# Patient Record
Sex: FEMALE | Race: WHITE | NOT HISPANIC OR LATINO | Employment: OTHER | URBAN - METROPOLITAN AREA
[De-identification: names, ages, dates, MRNs, and addresses within clinical notes are randomized per-mention and may not be internally consistent; named-entity substitution may affect disease eponyms.]

---

## 2021-12-23 ENCOUNTER — APPOINTMENT (OUTPATIENT)
Dept: RADIOLOGY | Facility: MEDICAL CENTER | Age: 86
DRG: 065 | End: 2021-12-23
Attending: EMERGENCY MEDICINE
Payer: MEDICARE

## 2021-12-23 ENCOUNTER — APPOINTMENT (OUTPATIENT)
Dept: RADIOLOGY | Facility: MEDICAL CENTER | Age: 86
DRG: 065 | End: 2021-12-23
Attending: STUDENT IN AN ORGANIZED HEALTH CARE EDUCATION/TRAINING PROGRAM
Payer: MEDICARE

## 2021-12-23 ENCOUNTER — HOSPITAL ENCOUNTER (INPATIENT)
Facility: MEDICAL CENTER | Age: 86
LOS: 1 days | DRG: 065 | End: 2021-12-24
Attending: EMERGENCY MEDICINE | Admitting: STUDENT IN AN ORGANIZED HEALTH CARE EDUCATION/TRAINING PROGRAM
Payer: MEDICARE

## 2021-12-23 DIAGNOSIS — I48.91 ATRIAL FIBRILLATION WITH RAPID VENTRICULAR RESPONSE (HCC): ICD-10-CM

## 2021-12-23 DIAGNOSIS — I48.91 ATRIAL FIBRILLATION WITH RVR (HCC): ICD-10-CM

## 2021-12-23 DIAGNOSIS — I65.22 STENOSIS OF LEFT CAROTID ARTERY: ICD-10-CM

## 2021-12-23 DIAGNOSIS — Z79.01 ANTICOAGULATED: ICD-10-CM

## 2021-12-23 DIAGNOSIS — I63.9 ACUTE ISCHEMIC STROKE (HCC): ICD-10-CM

## 2021-12-23 DIAGNOSIS — I65.29 STENOSIS OF CAROTID ARTERY, UNSPECIFIED LATERALITY: ICD-10-CM

## 2021-12-23 LAB
ABO + RH BLD: NORMAL
ABO GROUP BLD: NORMAL
ALBUMIN SERPL BCP-MCNC: 4.5 G/DL (ref 3.2–4.9)
ALBUMIN/GLOB SERPL: 1.6 G/DL
ALP SERPL-CCNC: 82 U/L (ref 30–99)
ALT SERPL-CCNC: 21 U/L (ref 2–50)
ANION GAP SERPL CALC-SCNC: 16 MMOL/L (ref 7–16)
APTT PPP: 30.1 SEC (ref 24.7–36)
AST SERPL-CCNC: 31 U/L (ref 12–45)
BASOPHILS # BLD AUTO: 0.3 % (ref 0–1.8)
BASOPHILS # BLD: 0.03 K/UL (ref 0–0.12)
BILIRUB SERPL-MCNC: 0.6 MG/DL (ref 0.1–1.5)
BLD GP AB SCN SERPL QL: NORMAL
BUN SERPL-MCNC: 17 MG/DL (ref 8–22)
CALCIUM SERPL-MCNC: 9.9 MG/DL (ref 8.5–10.5)
CHLORIDE SERPL-SCNC: 101 MMOL/L (ref 96–112)
CO2 SERPL-SCNC: 18 MMOL/L (ref 20–33)
CREAT SERPL-MCNC: 0.58 MG/DL (ref 0.5–1.4)
EOSINOPHIL # BLD AUTO: 0 K/UL (ref 0–0.51)
EOSINOPHIL NFR BLD: 0 % (ref 0–6.9)
ERYTHROCYTE [DISTWIDTH] IN BLOOD BY AUTOMATED COUNT: 47.8 FL (ref 35.9–50)
EST. AVERAGE GLUCOSE BLD GHB EST-MCNC: 117 MG/DL
ETHANOL BLD-MCNC: <10.1 MG/DL (ref 0–10)
GLOBULIN SER CALC-MCNC: 2.8 G/DL (ref 1.9–3.5)
GLUCOSE SERPL-MCNC: 163 MG/DL (ref 65–99)
HBA1C MFR BLD: 5.7 % (ref 4–5.6)
HCT VFR BLD AUTO: 39.4 % (ref 37–47)
HGB BLD-MCNC: 12.8 G/DL (ref 12–16)
IMM GRANULOCYTES # BLD AUTO: 0.04 K/UL (ref 0–0.11)
IMM GRANULOCYTES NFR BLD AUTO: 0.5 % (ref 0–0.9)
INR PPP: 1.24 (ref 0.87–1.13)
LYMPHOCYTES # BLD AUTO: 0.89 K/UL (ref 1–4.8)
LYMPHOCYTES NFR BLD: 10.3 % (ref 22–41)
MCH RBC QN AUTO: 30.8 PG (ref 27–33)
MCHC RBC AUTO-ENTMCNC: 32.5 G/DL (ref 33.6–35)
MCV RBC AUTO: 94.7 FL (ref 81.4–97.8)
MONOCYTES # BLD AUTO: 0.51 K/UL (ref 0–0.85)
MONOCYTES NFR BLD AUTO: 5.9 % (ref 0–13.4)
NEUTROPHILS # BLD AUTO: 7.17 K/UL (ref 2–7.15)
NEUTROPHILS NFR BLD: 83 % (ref 44–72)
NRBC # BLD AUTO: 0 K/UL
NRBC BLD-RTO: 0 /100 WBC
PLATELET # BLD AUTO: 193 K/UL (ref 164–446)
PMV BLD AUTO: 11.2 FL (ref 9–12.9)
POTASSIUM SERPL-SCNC: 4.1 MMOL/L (ref 3.6–5.5)
PROT SERPL-MCNC: 7.3 G/DL (ref 6–8.2)
PROTHROMBIN TIME: 15.3 SEC (ref 12–14.6)
RBC # BLD AUTO: 4.16 M/UL (ref 4.2–5.4)
RH BLD: NORMAL
SODIUM SERPL-SCNC: 135 MMOL/L (ref 135–145)
TROPONIN T SERPL-MCNC: 11 NG/L (ref 6–19)
WBC # BLD AUTO: 8.6 K/UL (ref 4.8–10.8)

## 2021-12-23 PROCEDURE — 94760 N-INVAS EAR/PLS OXIMETRY 1: CPT

## 2021-12-23 PROCEDURE — 82077 ASSAY SPEC XCP UR&BREATH IA: CPT

## 2021-12-23 PROCEDURE — 80053 COMPREHEN METABOLIC PANEL: CPT

## 2021-12-23 PROCEDURE — 86900 BLOOD TYPING SEROLOGIC ABO: CPT

## 2021-12-23 PROCEDURE — 73630 X-RAY EXAM OF FOOT: CPT | Mod: RT

## 2021-12-23 PROCEDURE — 700102 HCHG RX REV CODE 250 W/ 637 OVERRIDE(OP): Performed by: STUDENT IN AN ORGANIZED HEALTH CARE EDUCATION/TRAINING PROGRAM

## 2021-12-23 PROCEDURE — 83036 HEMOGLOBIN GLYCOSYLATED A1C: CPT

## 2021-12-23 PROCEDURE — 93005 ELECTROCARDIOGRAM TRACING: CPT | Performed by: EMERGENCY MEDICINE

## 2021-12-23 PROCEDURE — 700101 HCHG RX REV CODE 250: Performed by: EMERGENCY MEDICINE

## 2021-12-23 PROCEDURE — 770020 HCHG ROOM/CARE - TELE (206)

## 2021-12-23 PROCEDURE — 70498 CT ANGIOGRAPHY NECK: CPT

## 2021-12-23 PROCEDURE — 0042T CT-CEREBRAL PERFUSION ANALYSIS: CPT

## 2021-12-23 PROCEDURE — 99221 1ST HOSP IP/OBS SF/LOW 40: CPT | Performed by: PSYCHIATRY & NEUROLOGY

## 2021-12-23 PROCEDURE — 99285 EMERGENCY DEPT VISIT HI MDM: CPT

## 2021-12-23 PROCEDURE — 85025 COMPLETE CBC W/AUTO DIFF WBC: CPT

## 2021-12-23 PROCEDURE — 73080 X-RAY EXAM OF ELBOW: CPT | Mod: LT

## 2021-12-23 PROCEDURE — 85610 PROTHROMBIN TIME: CPT

## 2021-12-23 PROCEDURE — 85730 THROMBOPLASTIN TIME PARTIAL: CPT

## 2021-12-23 PROCEDURE — 71045 X-RAY EXAM CHEST 1 VIEW: CPT

## 2021-12-23 PROCEDURE — 70551 MRI BRAIN STEM W/O DYE: CPT | Mod: ME

## 2021-12-23 PROCEDURE — 97162 PT EVAL MOD COMPLEX 30 MIN: CPT

## 2021-12-23 PROCEDURE — A9270 NON-COVERED ITEM OR SERVICE: HCPCS | Performed by: STUDENT IN AN ORGANIZED HEALTH CARE EDUCATION/TRAINING PROGRAM

## 2021-12-23 PROCEDURE — 700102 HCHG RX REV CODE 250 W/ 637 OVERRIDE(OP): Performed by: EMERGENCY MEDICINE

## 2021-12-23 PROCEDURE — 84484 ASSAY OF TROPONIN QUANT: CPT

## 2021-12-23 PROCEDURE — 86901 BLOOD TYPING SEROLOGIC RH(D): CPT

## 2021-12-23 PROCEDURE — 97535 SELF CARE MNGMENT TRAINING: CPT

## 2021-12-23 PROCEDURE — 70496 CT ANGIOGRAPHY HEAD: CPT | Mod: MH

## 2021-12-23 PROCEDURE — 70450 CT HEAD/BRAIN W/O DYE: CPT

## 2021-12-23 PROCEDURE — 86850 RBC ANTIBODY SCREEN: CPT

## 2021-12-23 PROCEDURE — A9270 NON-COVERED ITEM OR SERVICE: HCPCS | Performed by: EMERGENCY MEDICINE

## 2021-12-23 PROCEDURE — 700117 HCHG RX CONTRAST REV CODE 255: Performed by: EMERGENCY MEDICINE

## 2021-12-23 PROCEDURE — 96374 THER/PROPH/DIAG INJ IV PUSH: CPT | Mod: XU

## 2021-12-23 PROCEDURE — 700105 HCHG RX REV CODE 258: Performed by: INTERNAL MEDICINE

## 2021-12-23 PROCEDURE — 92610 EVALUATE SWALLOWING FUNCTION: CPT

## 2021-12-23 PROCEDURE — 99223 1ST HOSP IP/OBS HIGH 75: CPT | Mod: AI | Performed by: STUDENT IN AN ORGANIZED HEALTH CARE EDUCATION/TRAINING PROGRAM

## 2021-12-23 RX ORDER — ROSUVASTATIN CALCIUM 20 MG/1
40 TABLET, COATED ORAL ONCE
Status: COMPLETED | OUTPATIENT
Start: 2021-12-23 | End: 2021-12-23

## 2021-12-23 RX ORDER — ROSUVASTATIN CALCIUM 20 MG/1
40 TABLET, COATED ORAL EVERY EVENING
Status: DISCONTINUED | OUTPATIENT
Start: 2021-12-23 | End: 2021-12-24 | Stop reason: HOSPADM

## 2021-12-23 RX ORDER — ROSUVASTATIN CALCIUM 10 MG/1
10 TABLET, COATED ORAL EVERY EVENING
COMMUNITY

## 2021-12-23 RX ORDER — METOPROLOL TARTRATE 1 MG/ML
5 INJECTION, SOLUTION INTRAVENOUS ONCE
Status: COMPLETED | OUTPATIENT
Start: 2021-12-23 | End: 2021-12-23

## 2021-12-23 RX ORDER — METOPROLOL TARTRATE 50 MG/1
50 TABLET, FILM COATED ORAL TWICE DAILY
Status: DISCONTINUED | OUTPATIENT
Start: 2021-12-23 | End: 2021-12-23

## 2021-12-23 RX ORDER — METOPROLOL TARTRATE 50 MG/1
50 TABLET, FILM COATED ORAL 3 TIMES DAILY
COMMUNITY

## 2021-12-23 RX ORDER — LATANOPROST 50 UG/ML
1 SOLUTION/ DROPS OPHTHALMIC EVERY EVENING
Status: DISCONTINUED | OUTPATIENT
Start: 2021-12-23 | End: 2021-12-24 | Stop reason: HOSPADM

## 2021-12-23 RX ORDER — FLUTICASONE PROPIONATE 50 MCG
2 SPRAY, SUSPENSION (ML) NASAL
COMMUNITY

## 2021-12-23 RX ORDER — ACETAMINOPHEN 325 MG/1
325 TABLET ORAL EVERY 4 HOURS PRN
COMMUNITY

## 2021-12-23 RX ORDER — DORZOLAMIDE HYDROCHLORIDE AND TIMOLOL MALEATE 20; 5 MG/ML; MG/ML
1 SOLUTION/ DROPS OPHTHALMIC 2 TIMES DAILY
COMMUNITY

## 2021-12-23 RX ORDER — ASPIRIN 81 MG/1
81 TABLET, CHEWABLE ORAL DAILY
Status: DISCONTINUED | OUTPATIENT
Start: 2021-12-23 | End: 2021-12-23

## 2021-12-23 RX ORDER — ATORVASTATIN CALCIUM 20 MG/1
80 TABLET, FILM COATED ORAL EVERY EVENING
Status: DISCONTINUED | OUTPATIENT
Start: 2021-12-23 | End: 2021-12-23

## 2021-12-23 RX ORDER — SODIUM CHLORIDE, SODIUM LACTATE, POTASSIUM CHLORIDE, CALCIUM CHLORIDE 600; 310; 30; 20 MG/100ML; MG/100ML; MG/100ML; MG/100ML
INJECTION, SOLUTION INTRAVENOUS CONTINUOUS
Status: ACTIVE | OUTPATIENT
Start: 2021-12-23 | End: 2021-12-24

## 2021-12-23 RX ORDER — DORZOLAMIDE HYDROCHLORIDE AND TIMOLOL MALEATE 20; 5 MG/ML; MG/ML
1 SOLUTION/ DROPS OPHTHALMIC 2 TIMES DAILY
Status: DISCONTINUED | OUTPATIENT
Start: 2021-12-23 | End: 2021-12-24 | Stop reason: HOSPADM

## 2021-12-23 RX ORDER — METOPROLOL TARTRATE 50 MG/1
50 TABLET, FILM COATED ORAL EVERY 8 HOURS
Status: DISCONTINUED | OUTPATIENT
Start: 2021-12-23 | End: 2021-12-23

## 2021-12-23 RX ORDER — BIMATOPROST 0.3 MG/ML
1 SOLUTION/ DROPS OPHTHALMIC EVERY EVENING
COMMUNITY

## 2021-12-23 RX ORDER — METOPROLOL SUCCINATE 25 MG/1
12.5 TABLET, EXTENDED RELEASE ORAL DAILY
COMMUNITY

## 2021-12-23 RX ADMIN — DORZOLAMIDE HYDROCHLORIDE AND TIMOLOL MALEATE 1 DROP: 20; 5 SOLUTION/ DROPS OPHTHALMIC at 17:45

## 2021-12-23 RX ADMIN — LATANOPROST 1 DROP: 50 SOLUTION OPHTHALMIC at 17:45

## 2021-12-23 RX ADMIN — APIXABAN 2.5 MG: 5 TABLET, FILM COATED ORAL at 06:22

## 2021-12-23 RX ADMIN — ROSUVASTATIN CALCIUM 40 MG: 20 TABLET, FILM COATED ORAL at 06:44

## 2021-12-23 RX ADMIN — METOPROLOL TARTRATE 50 MG: 50 TABLET, FILM COATED ORAL at 04:31

## 2021-12-23 RX ADMIN — APIXABAN 2.5 MG: 5 TABLET, FILM COATED ORAL at 17:44

## 2021-12-23 RX ADMIN — SODIUM CHLORIDE, POTASSIUM CHLORIDE, SODIUM LACTATE AND CALCIUM CHLORIDE: 600; 310; 30; 20 INJECTION, SOLUTION INTRAVENOUS at 10:20

## 2021-12-23 RX ADMIN — METOPROLOL TARTRATE 5 MG: 1 INJECTION, SOLUTION INTRAVENOUS at 03:55

## 2021-12-23 RX ADMIN — IOHEXOL 40 ML: 350 INJECTION, SOLUTION INTRAVENOUS at 03:23

## 2021-12-23 RX ADMIN — IOHEXOL 80 ML: 350 INJECTION, SOLUTION INTRAVENOUS at 03:26

## 2021-12-23 RX ADMIN — ROSUVASTATIN CALCIUM 40 MG: 20 TABLET, FILM COATED ORAL at 17:45

## 2021-12-23 ASSESSMENT — ENCOUNTER SYMPTOMS
CHILLS: 0
PSYCHIATRIC NEGATIVE: 1
GASTROINTESTINAL NEGATIVE: 1
FOCAL WEAKNESS: 1
VOMITING: 0
MYALGIAS: 0
CONSTITUTIONAL NEGATIVE: 1
ABDOMINAL PAIN: 0
SHORTNESS OF BREATH: 0
DEPRESSION: 0
CARDIOVASCULAR NEGATIVE: 1
HEADACHES: 0
DIZZINESS: 0
RESPIRATORY NEGATIVE: 1
EYES NEGATIVE: 1
WEAKNESS: 1
NAUSEA: 0
MUSCULOSKELETAL NEGATIVE: 1
FEVER: 0

## 2021-12-23 ASSESSMENT — CHA2DS2 SCORE
AGE 65 TO 74: NO
HYPERTENSION: NO
DIABETES: NO
SEX: FEMALE
CHF OR LEFT VENTRICULAR DYSFUNCTION: NO
AGE 75 OR GREATER: YES
VASCULAR DISEASE: NO
CHA2DS2 VASC SCORE: 5
PRIOR STROKE OR TIA OR THROMBOEMBOLISM: YES

## 2021-12-23 ASSESSMENT — COGNITIVE AND FUNCTIONAL STATUS - GENERAL
MOBILITY SCORE: 20
WALKING IN HOSPITAL ROOM: A LITTLE
CLIMB 3 TO 5 STEPS WITH RAILING: A LITTLE
STANDING UP FROM CHAIR USING ARMS: A LITTLE
MOVING FROM LYING ON BACK TO SITTING ON SIDE OF FLAT BED: A LITTLE
SUGGESTED CMS G CODE MODIFIER MOBILITY: CJ

## 2021-12-23 ASSESSMENT — GAIT ASSESSMENTS
ASSISTIVE DEVICE: HAND HELD ASSIST;FRONT WHEEL WALKER
GAIT LEVEL OF ASSIST: MINIMAL ASSIST
DEVIATION: STEP TO;OTHER (COMMENT)
DISTANCE (FEET): 100

## 2021-12-23 ASSESSMENT — PAIN DESCRIPTION - PAIN TYPE: TYPE: ACUTE PAIN

## 2021-12-23 NOTE — ED NOTES
Rounded on Pt. Updated Pt on plan of care. Pt verbalized understanding.  No acute distress at this time.  Will continue to monitor.

## 2021-12-23 NOTE — CARE PLAN
The patient is Stable - Low risk of patient condition declining or worsening    Shift Goals  Clinical Goals: free from falls  Patient Goals: comfort  Family Goals: doyle    Progress made toward(s) clinical / shift goals:  fall precautions in place, paged PT to evaluate pt's gait, coordination    Patient is not progressing towards the following goals:

## 2021-12-23 NOTE — ASSESSMENT & PLAN NOTE
?TIA, no CVA seen on MRI   Admit patient to neurology floor  Neurology consulted  MRI brain without contrast  Neuro check every 4 hours  Continue crestor

## 2021-12-23 NOTE — ED NOTES
Pt had MRI approximately 2 months ago at [facility]; Pt has not had implants or surgeries since.

## 2021-12-23 NOTE — ED TRIAGE NOTES
"Chief Complaint   Patient presents with   • Weakness     [facility]KW [facility] L [facility]ided weakness, slurred speech. pt left, symptoms worsened 4 hrs ago. pt A&Ox1-2, alert to self        BIB [place] [facility] to [facility], pt on monitor and in gown, labs drawn and sent. Pt consists of: above complaint, pt had fall PTA d/t pt s/s worsening - increasing weakness. pt dx w/ dysarthria in previous hospital. Pt baseline is A&Ox4, pt from [facility], hx of afib and stent placement, pt on eliquis. Daughter at bedside. Pt called code stroke IR upon arrival, ERP, ct, and lab at bedside immediately. Taken to ct immediately.        NIHSS score 4     Medications given en route:n/a     BP (!) 207/110   Pulse (!) 136   Resp 18   Ht 1.753 m (5' 9\")   Wt 81.6 kg (180 lb)   SpO2 99%   BMI 26.58 kg/m²   "

## 2021-12-23 NOTE — CONSULTS
Neurology Consult  Neurohospitalist Service, [facility]    Referring Physician: Margaret Nelson D.O.    Reason for Referral: Slurred speech and left sided weakness    HPI: Rosibel Corbett is a 86 y.o. left-handed female with history of HTN, HLD, CAD with stent, A Fib on Eliquis who presented to [facility] via EMS this morning s/p being found on the floor by family s/p presumed fall. BP on arrival 207/110. FSBS 170. Prior to her fall, the patient was seen at [facility] on 12/22/21. Her daughter, Marisela, reports that the patient awoke at 0500 on the morning of 12/22/21 and complained of not feeling well. She then went back to bed. She awoke around noon, and her daughter made her some lunch, but the patient was unable to feed herself, which is a change from baseline. Her daughter states she had slurred speech and was confused and asking repetitive questions. The patient's family brought her to [facility] in [place] for evaluation. The patient had a negative head CT, and CTA at OSH revealed severe stenosis of the left ICA at 80% and moderate stenossi of the R ICA at 50%. Per the daughter, an MRI was recommended to rule out stroke, but the patient declined, wishing to go home. She returned to her family's cabin in [place]. Her daughter reports that she continued to be confused, struggled to find the bathroom, was confused about the location of the room in which she was staying within the cabin. Of note, this is not the patient's home environment. She resides in an independent living senior center in [place], and is staying with her family in [place] for the holidays. She eventually went to bed, but her family found her on the floor during the night which prompted them to call 911 and led to her arrival at our facility. Neuro imaging was repeated at this facility on arrival. CT head again without acute abnomality, CTA head and neck redemonstarted aforementioned  bilateral ICA stenosis. The patient reports stenosis of b/l ICA is known and followed in her home town of [place]. She has a follow up appointment with vascular surgeon, Dr. Daniel Callejas, on Jan 4th. At the time of my exam the patient is without focal neurologic deficit. She reports chronic weakness of her LLE for which she receives PT. She has fresh abrasions to her RLE and left great toe , presumably from a fall overnight which led to her being found on the floor. The patient has no recollection of this event.     Review of systems: In addition to what is detailed in the HPI above, all other systems reviewed and are negative.    Past Medical History:    has a past medical history of Hypertension.    FHx:  family history is not on file.    SHx:   reports that she has never smoked. She has never used smokeless tobacco. She reports current alcohol use. She reports that she does not use drugs.    Allergies:  No Known Allergies    Medications:    Current Facility-Administered Medications:   •  Allow for permissive hypertension: SBP up to 220 mmHg/DBP to 120 mmHg; If SBP greater than 220 mmHg or DBP greater than 120 mmHg administer PRN antihypertensive medications., , Other, PHARMACY TO DOSE, Gucci Nava M.D.  •  rosuvastatin (CRESTOR) tablet 40 mg, 40 mg, Oral, Q EVENING, Gucci Nava M.D.  •  apixaban (ELIQUIS) tablet 2.5 mg, 2.5 mg, Oral, BID, Gucci Nava M.D., 2.5 mg at 12/23/21 0622  •  dorzolamide-timolol (COSOPT) 22.3-6.8 MG/ML ophthalmic drops 1 Drop, 1 Drop, Both Eyes, BID, Gucci Nava M.D.  •  latanoprost (XALATAN) 0.005 % ophthalmic solution 1 Drop, 1 Drop, Both Eyes, Q EVENING, Gucci Nava M.D.  •  lactated ringers infusion, , Intravenous, Continuous, Margaret Nelson D.O., Last Rate: 75 mL/hr at 12/23/21 1020, New Bag at 12/23/21 1020    Current Outpatient Medications:   •  acetaminophen (TYLENOL) 325 MG Tab, Take 325 mg by mouth every four hours as needed for Mild  Pain., Disp: , Rfl:   •  bimatoprost (LUMIGAN) 0.03 % Solution, Administer 1 Drop into both eyes every evening., Disp: , Rfl:   •  dorzolamide-timolol (COSOPT) 22.3-6.8 MG/ML Solution, Administer 1 Drop into both eyes 2 times a day., Disp: , Rfl:   •  apixaban (ELIQUIS) 2.5mg Tab, Take 2.5 mg by mouth 2 times a day., Disp: , Rfl:   •  fluticasone (FLONASE) 50 MCG/ACT nasal spray, Administer 2 Sprays into affected nostril(S) at bedtime., Disp: , Rfl:   •  metoprolol SR (TOPROL XL) 25 MG TABLET SR 24 HR, Take 12.5 mg by mouth every day., Disp: , Rfl:   •  metoprolol tartrate (LOPRESSOR) 50 MG Tab, Take 50 mg by mouth in the morning, at noon, and at bedtime., Disp: , Rfl:   •  rosuvastatin (CRESTOR) 10 MG Tab, Take 10 mg by mouth every evening., Disp: , Rfl:     Physical Examination:    Vitals:    12/23/21 1000 12/23/21 1015 12/23/21 1020 12/23/21 1100   BP: (!) 164/97   145/71   Pulse: 93 91 98 81   Resp: (!) 22 (!) 22 18 18   Temp:       SpO2: 98% 98% 96% 98%   Weight:       Height:           General: Patient is awake and in no acute distress  Eyes: examination of optic disks not indicated at this time given acuity of consult  CV: A Fib     NEUROLOGICAL EXAM:     Mental status: Awake, alert and fully oriented, follows commands  Speech and language: speech is not dysarthric. The patient is able to name and repeat.  Cranial nerve exam: Pupils are equal, round and reactive to light bilaterally. Visual fields are full. 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 Extraocular muscles are intact. Sensation in the face is intact to light touch. Face is symmetric. Tongue is midline.  Motor exam: Strength is 3/5 in the left lower extremity. Of note, she reports chronic LLE weakness for which she receives PT. All other extremities 4+/5. Tone is normal. No abnormal movements were seen on exam.  Sensory exam: No sensory deficits identified   Deep tendon reflexes:  deferred  Coordination: no ataxia   Gait: deferred    NIHSS: National Institutes of Health Stroke Scale at 1204     [0] 1a:Level of Consciousness             0-alert 1-drowsy   2-stupor   3-coma  [0] 1b:LOC Questions                          0-both  1-one      2-neither  [0] 1c:LOC Commands                        0-both  1-one      2-neither  [0] 2: Best Gaze                        0-nl    1-partial  2-forced  [0] 3: Visual Fields                               0-nl    1-partial  2-complete 3-bilat  [0] 4: Facial Paresis                  0-nl    1-minor    2-partial  3-full    MOTOR                                               0-nl  [0] 5: Right Arm                         1-drift  [0] 6: Left Arm                                      2-some effort vs gravity  [0] 7: Right Leg                         3-no effort vs gravity  [1] 8: Left Leg                                      4-no movement                                                              x-untestable    [0] 9: Limb Ataxia                      0-abs   1-1_limb   2-2+_limbs   x-untestable  [0] 10:Sensory                          0-nl    1-partial  2-dense  [0] 11:Best Language/Aphasia            0-nl    1-mild/mod 2-severe   3-mute  [0] 12:Dysarthria                       0-nl    1-mild/mod 2-severe   x-untestable  [0] 13:Neglect/Inattention                    0-none  1-partial  2-complete    [1] TOTAL     Modified Yessi Scale (MRS): 3 = Moderate disability; requires some help, but able to walk without assistance    Objective Data:    Labs:  Lab Results   Component Value Date/Time    PROTHROMBTM 15.3 (H) 12/23/2021 03:06 AM    INR 1.24 (H) 12/23/2021 03:06 AM      Lab Results   Component Value Date/Time    WBC 8.6 12/23/2021 03:06 AM    RBC 4.16 (L) 12/23/2021 03:06 AM    HEMOGLOBIN 12.8 12/23/2021 03:06 AM    HEMATOCRIT 39.4 12/23/2021 03:06 AM    MCV 94.7 12/23/2021 03:06 AM    MCH 30.8 12/23/2021 03:06 AM    MCHC 32.5 (L) 12/23/2021 03:06 AM    MPV 11.2  12/23/2021 03:06 AM    NEUTSPOLYS 83.00 (H) 12/23/2021 03:06 AM    LYMPHOCYTES 10.30 (L) 12/23/2021 03:06 AM    MONOCYTES 5.90 12/23/2021 03:06 AM    EOSINOPHILS 0.00 12/23/2021 03:06 AM    BASOPHILS 0.30 12/23/2021 03:06 AM      Lab Results   Component Value Date/Time    SODIUM 135 12/23/2021 03:06 AM    POTASSIUM 4.1 12/23/2021 03:06 AM    CHLORIDE 101 12/23/2021 03:06 AM    CO2 18 (L) 12/23/2021 03:06 AM    GLUCOSE 163 (H) 12/23/2021 03:06 AM    BUN 17 12/23/2021 03:06 AM    CREATININE 0.58 12/23/2021 03:06 AM      No results found for: CHOLSTRLTOT, LDL, HDL, TRIGLYCERIDE    Lab Results   Component Value Date/Time    ALKPHOSPHAT 82 12/23/2021 03:06 AM    ASTSGOT 31 12/23/2021 03:06 AM    ALTSGPT 21 12/23/2021 03:06 AM    TBILIRUBIN 0.6 12/23/2021 03:06 AM        Imaging/Testing:    I interpreted and/or reviewed the patient's neuroimaging    MR-BRAIN-W/O   Final Result      1.  Mild cerebral atrophy. Age-appropriate.   2.  Moderate supratentorial white matter disease most consistent with microvascular ischemic change.   3.  No evidence of acute infarction, acute hemorrhage, or mass lesion.      DX-FOOT-COMPLETE 3+ RIGHT   Final Result      No acute fracture or dislocation is noted.      DX-ELBOW-COMPLETE 3+ LEFT   Final Result      No radiographic evidence of acute traumatic injury.      DX-CHEST-PORTABLE (1 VIEW)   Final Result      1.  No acute cardiac or pulmonary abnormalities are identified.   2.  Cardiomegaly.   3.  Prominent interstitial markings likely reflecting chronic small airways disease.      CT-CTA NECK WITH & W/O-POST PROCESSING   Final Result      1.  No occlusion or dissection of the arteries of the neck.   2.  Severe stenosis of the left internal carotid artery origin. Moderate stenosis of the right internal carotid artery origin.   3.  Gas again seen in the right  space space of uncertain clinical significance. Correlate for trauma or infection.   4.  Gas in the facial vein and right  IJ vein likely related to venous access.      CT-CTA HEAD WITH & W/O-POST PROCESS   Final Result      No large vessel occlusion or aneurysm      CT-CEREBRAL PERFUSION ANALYSIS   Final Result      1.  Cerebral blood flow less than 30% likely representing completed infarct = 0 mL.      2.  T Max more than 6 seconds likely representing combination of completed infarct and ischemia = 7 mL.      3.  Mismatched volume likely representing ischemic brain/penumbra = 7 mL      4.  Please note that the cerebral perfusion was performed on the limited brain tissue around the basal ganglia region. Infarct/ischemia outside the CT perfusion sections can be missed in this study.      CT-HEAD W/O   Final Result      1. Cerebral atrophy.   2. White matter lucencies most consistent with small vessel ischemic change versus demyelination or gliosis.   3. No acute intracranial abnormality.   4. Soft tissue gas of the right retropharyngeal fat and right  space of uncertain clinical significance. Correlate for trauma or infection.             Assessment and Plan:    Rosibel Corbett is a 86 y.o. left-handed female with history of HTN, HLD, CAD with stent, A Fib on Eliis who presented to [facility] via EMS this morning s/p sustaining a fall. BP on arrival 207/110. FSBS 170. Prior to her fall, the patient was seen at [facility] on 12/22/21 for slurred speech and confusion, discharged to home following evaluation and negative head CT. Vessel imaging at OSH revealed severe stenosis of the left ICA at 80% and moderate stenosis of the R ICA at 50%. Neuro imaging repeated at this facility on arrival. CT head again without acute abnomality, CTA head and neck redemonstarted aforementioned bilateral ICA stenosis. The patient reports stenosis of b/l ICA is known and followed in her home town of [place]. She has a follow up appointment with vascular surgeon, Dr. Daniel Callejas, on Jan 4th. At the time of my exam the patient is  without focal neurologic deficit. MRI Brain without is negative for acute infarct. Considered differential diagnosis of left hemispheric TIA due to severe stenosis of the left ICA, however, there is no clear report of right sided weakness or aphasia in the history from patient or daughter or in the documentation from this visit. Reported history is more concerning for acute confusion in setting of a change in the patient's environment. She is already optimized from a stroke prevention perspective. Recommend continuing home medications and follow up with PCP and established vascular surgeon on 1/4/21. No further recommendations or further studies from an acute neurological standpoint at this time. Please re-consult if you have further questions or there is a change in status.    Plan:  - Continue home medications  - Follow up with established vascular surgeon, Dr. Daniel Callejas, for ongoing monitoring and management of known carotid stenosis.    The evaluation of the patient, and recommended management, was discussed with attending neurologist, Dr. Emmett Pollock and hospitalist, Dr. Margaret Nelson.    Corinne Canavero, APRN  Acute Care Neurohospitalist Service

## 2021-12-23 NOTE — ED NOTES
Med rec completed per pt's transfer paperwork from [facility] and interview with pt's daughter.   Allergies reviewed.

## 2021-12-23 NOTE — ED NOTES
Report received, assuming care.  Pt incontinent of urine, pt cleaned, bedding changed.  Pt requesting to be placed on bed pan and voided 500cc's of urine. Pt A&Ox2, disoriented to place and situation.  Denies pain.  No drift noted.  MRI here to take pt to her scan, pt transported via gurney.

## 2021-12-23 NOTE — ED NOTES
Report given to CHAVA Mansfield for transfer to [place] [address], transported with all personal belongings

## 2021-12-23 NOTE — ED NOTES
Introduced self to Pt; informed her that I am part of her care team.  Rounded on Pt. Updated Pt on plan of care. Pt verbalized understanding.  No acute distress at this time.  Will continue to monitor.

## 2021-12-23 NOTE — H&P
Hospital Medicine History & Physical Note    Date of Service  12/23/2021    Primary Care Physician  No primary care provider on file.    Consultants  neurology    Specialist Names: Dr. Pollock    Code Status  Full Code    Chief Complaint  Chief Complaint   Patient presents with   • Weakness     LKW 1000 pt to [facility] for L sided weakness, slurred speech. pt left, symptoms worsened 4 hrs ago. pt A&Ox1-2, alert to self        History of Presenting Illness  Rosibel Corbett is a 86 y.o. female who presented 12/23/2021 with a mechanical fall and altered mental status.  Patient initially was noted to have weakness of the left upper extremity at 1 PM yesterday.  She was then taken to hospital in [facility], CT head there was negative, and she was sent home.  Shortly after she got home, she began to have episodes of confusion where she did not know where she was.  She decided to sleep it off, she woke up in the middle of the night and had a mechanical fall, and was noted to have facial droop and left upper extremity weakness, motivating daughter to call EMS to take patient to the ED for evaluation.    Patient has a history of 2 cardiac stents placed in 2010.  Denies history of stroke in the past.    In the ED, patient was found to be hypertensive and tachycardic.  Remarkable labs include hyperglycemia, INR 1.24.  CT head and neck pertinent for severe stenosis of the left internal carotid artery region and moderate stenosis of the right internal carotid artery region.  EKG shows atrial fibrillation with RVR.  Neurology was consulted, recommend MRI brain without contrast in a.m. and to continue Eliquis. No candidate for intervention as patient out of window.    I discussed the plan of care with patient.    Review of Systems  Review of Systems   Constitutional: Negative.    HENT: Negative.    Eyes: Negative.    Respiratory: Negative.    Cardiovascular: Negative.    Gastrointestinal: Negative.    Genitourinary: Negative.     Musculoskeletal: Negative.    Skin: Negative.    Neurological: Positive for focal weakness.   Endo/Heme/Allergies: Negative.    Psychiatric/Behavioral: Negative.        Past Medical History   has a past medical history of Hypertension.    Surgical History   has a past surgical history that includes stent placement.     Family History   Family history reviewed with patient. There is no family history that is pertinent to the chief complaint.     Social History   reports that she has never smoked. She has never used smokeless tobacco. She reports current alcohol use. She reports that she does not use drugs.    Allergies  No Known Allergies    Medications  Prior to Admission Medications   Prescriptions Last Dose Informant Patient Reported? Taking?   acetaminophen (TYLENOL) 325 MG Tab UNK  Yes Yes   Sig: Take 325 mg by mouth every four hours as needed for Mild Pain.   apixaban (ELIQUIS) 2.5mg Tab unk at unk  Yes Yes   Sig: Take 2.5 mg by mouth 2 times a day.   bimatoprost (LUMIGAN) 0.03 % Solution unk at unk  Yes Yes   Sig: Administer 1 Drop into both eyes every evening.   dorzolamide-timolol (COSOPT) 22.3-6.8 MG/ML Solution unk at unk  Yes Yes   Sig: Administer 1 Drop into both eyes 2 times a day.   fluticasone (FLONASE) 50 MCG/ACT nasal spray unk at unk  Yes Yes   Sig: Administer 2 Sprays into affected nostril(S) at bedtime.   metoprolol SR (TOPROL XL) 25 MG TABLET SR 24 HR unk at unk  Yes Yes   Sig: Take 12.5 mg by mouth every day.   metoprolol tartrate (LOPRESSOR) 50 MG Tab unk at unk  Yes Yes   Sig: Take 50 mg by mouth in the morning, at noon, and at bedtime.      Facility-Administered Medications: None       Physical Exam  Pulse:  [131-143] 136  Resp:  [18-21] 18  BP: (169-207)/(110-138) 207/110  SpO2:  [92 %-99 %] 99 %  Blood Pressure : (!) 207/110       Pulse: (!) 136   Respiration: 18   Pulse Oximetry: 99 %       Physical Exam  Constitutional:       Appearance: Normal appearance. She is normal weight.   HENT:       Head: Normocephalic.      Nose: Nose normal.      Mouth/Throat:      Mouth: Mucous membranes are moist.   Eyes:      Pupils: Pupils are equal, round, and reactive to light.   Cardiovascular:      Rate and Rhythm: Tachycardia present. Rhythm irregular.   Pulmonary:      Effort: Pulmonary effort is normal.      Breath sounds: Normal breath sounds.   Abdominal:      General: Abdomen is flat. Bowel sounds are normal.      Palpations: Abdomen is soft.   Musculoskeletal:         General: Normal range of motion.      Cervical back: Normal range of motion.   Skin:     General: Skin is warm.   Neurological:      Mental Status: She is alert and oriented to person, place, and time. Mental status is at baseline.      Comments: Strength 3 out of 5 left upper extremity, 5 out of 5 in all extremities.  Sensation intact in all extremities  No facial droop no tongue deviation no dysarthria no dysdiadochokinesia   Psychiatric:         Mood and Affect: Mood normal.         Behavior: Behavior normal.         Thought Content: Thought content normal.         Judgment: Judgment normal.         Laboratory:  Recent Labs     12/23/21 0306   WBC 8.6   RBC 4.16*   HEMOGLOBIN 12.8   HEMATOCRIT 39.4   MCV 94.7   MCH 30.8   MCHC 32.5*   RDW 47.8   PLATELETCT 193   MPV 11.2     Recent Labs     12/23/21  0306   SODIUM 135   POTASSIUM 4.1   CHLORIDE 101   CO2 18*   GLUCOSE 163*   BUN 17   CREATININE 0.58   CALCIUM 9.9     Recent Labs     12/23/21  0306   ALTSGPT 21   ASTSGOT 31   ALKPHOSPHAT 82   TBILIRUBIN 0.6   GLUCOSE 163*     Recent Labs     12/23/21  0306   APTT 30.1   INR 1.24*     No results for input(s): NTPROBNP in the last 72 hours.      Recent Labs     12/23/21  0306   TROPONINT 11       Imaging:  DX-FOOT-COMPLETE 3+ RIGHT   Final Result      No acute fracture or dislocation is noted.      DX-ELBOW-COMPLETE 3+ LEFT   Final Result      No radiographic evidence of acute traumatic injury.      DX-CHEST-PORTABLE (1 VIEW)   Final Result       1.  No acute cardiac or pulmonary abnormalities are identified.   2.  Cardiomegaly.   3.  Prominent interstitial markings likely reflecting chronic small airways disease.      CT-CTA NECK WITH & W/O-POST PROCESSING   Final Result      1.  No occlusion or dissection of the arteries of the neck.   2.  Severe stenosis of the left internal carotid artery origin. Moderate stenosis of the right internal carotid artery origin.   3.  Gas again seen in the right  space space of uncertain clinical significance. Correlate for trauma or infection.   4.  Gas in the facial vein and right IJ vein likely related to venous access.      CT-CTA HEAD WITH & W/O-POST PROCESS   Final Result      No large vessel occlusion or aneurysm      CT-CEREBRAL PERFUSION ANALYSIS   Final Result      1.  Cerebral blood flow less than 30% likely representing completed infarct = 0 mL.      2.  T Max more than 6 seconds likely representing combination of completed infarct and ischemia = 7 mL.      3.  Mismatched volume likely representing ischemic brain/penumbra = 7 mL      4.  Please note that the cerebral perfusion was performed on the limited brain tissue around the basal ganglia region. Infarct/ischemia outside the CT perfusion sections can be missed in this study.      CT-HEAD W/O   Final Result      1. Cerebral atrophy.   2. White matter lucencies most consistent with small vessel ischemic change versus demyelination or gliosis.   3. No acute intracranial abnormality.   4. Soft tissue gas of the right retropharyngeal fat and right  space of uncertain clinical significance. Correlate for trauma or infection.         MR-BRAIN-W/O    (Results Pending)       X-Ray:  I have personally reviewed the images and compared with prior images.    Assessment/Plan:  I anticipate this patient will require at least two midnights for appropriate medical management, necessitating inpatient admission.    * Stroke (HCC)  Assessment &  Plan  Admit patient to neurology floor  Neurology consulted, follow official note  Will need neuro  MRI brain without contrast  Neuro check every 4 hours  Permissive hypertension  Start patient on crestor     Carotid artery stenosis  Assessment & Plan  Seen on CT angio  Will eventually need vascular surgery consult throughout admission    Atrial fibrillation with RVR ([facility])  Assessment & Plan  Continue metoprolol, metoprolol IV as needed  Continue eliquis   Telemetry

## 2021-12-23 NOTE — PROGRESS NOTES
[facility] Medicine Daily Progress Note    Date of Service  12/23/2021    Chief Complaint  Rosibel Corbett is a 86 y.o. female admitted 12/23/2021 with left sided weakness and slurreed speech.     Hospital Course   86 y.o. female who presented 12/23/2021 with a mechanical fall and altered mental status.  Patient initially was noted to have weakness of the left upper extremity at 1 PM yesterday.  She was then taken to hospital in [facility], CT head there was negative, and she was sent home.  Shortly after she got home, she began to have episodes of confusion where she did not know where she was.  She decided to sleep it off, she woke up in the middle of the night and had a mechanical fall, and was noted to have facial droop and left upper extremity weakness, motivating daughter to call EMS to take patient to the ED for evaluation. In the ED, patient was found to be hypertensive and tachycardic. CT head and neck pertinent for severe stenosis of the left internal carotid artery region and moderate stenosis of the right internal carotid artery region.  EKG shows atrial fibrillation with RVR.  Neurology was consulted, recommend MRI brain without contrast in a.m. and to continue Eliquis. No candidate for intervention as patient out of window.    Interval Problem Update  Patient tachycardic and with permissive hypertension. MRI brain showed mild cerebral atrophy, moderate supratentorial white matter disease, no evidence of acute infarction. Spoke with patient's daughter at length, updated on plan of care. Patient has known ICA stenosis being seen by vascular surgery in [place] has an appointment 1/4/22. Pending PT/OT.     I have personally seen and examined the patient at bedside. I discussed the plan of care with patient, family, bedside RN and neurology.    Consultants/Specialty  neurology    Code Status  Full Code    Disposition  Patient is not medically cleared for discharge.   Anticipate discharge to TBD.  I have  placed the appropriate orders for post-discharge needs.    Review of Systems  Review of Systems   Constitutional: Negative for chills and fever.   Respiratory: Negative for shortness of breath.    Cardiovascular: Negative for chest pain.   Gastrointestinal: Negative for abdominal pain, nausea and vomiting.   Genitourinary: Negative for dysuria.   Musculoskeletal: Negative for myalgias.   Skin: Negative for rash.   Neurological: Positive for weakness. Negative for dizziness and headaches.   Psychiatric/Behavioral: Negative for depression.   All other systems reviewed and are negative.       Physical Exam  Temp:  [36.7 °C (98.1 °F)] 36.7 °C (98.1 °F)  Pulse:  [] 89  Resp:  [16-66] 26  BP: (136-207)/() 146/66  SpO2:  [92 %-99 %] 96 %    Physical Exam  Vitals and nursing note reviewed.   Constitutional:       General: She is not in acute distress.  HENT:      Head: Normocephalic and atraumatic.   Eyes:      Conjunctiva/sclera: Conjunctivae normal.   Cardiovascular:      Rate and Rhythm: Tachycardia present. Rhythm irregular.      Heart sounds: No murmur heard.      Pulmonary:      Effort: Pulmonary effort is normal. No respiratory distress.      Breath sounds: Normal breath sounds. No wheezing.   Abdominal:      General: Abdomen is flat. There is no distension.      Palpations: Abdomen is soft.      Tenderness: There is no abdominal tenderness.   Musculoskeletal:         General: Normal range of motion.      Cervical back: Normal range of motion and neck supple.      Right lower leg: No edema.      Left lower leg: No edema.   Skin:     General: Skin is warm and dry.   Neurological:      General: No focal deficit present.      Mental Status: She is alert and oriented to person, place, and time.      Cranial Nerves: No cranial nerve deficit.      Sensory: No sensory deficit.      Motor: Weakness (LUE) present.      Comments: No facial droop   Psychiatric:         Mood and Affect: Mood normal.         Behavior:  Behavior normal.         Fluids  No intake or output data in the 24 hours ending 12/23/21 1401    Laboratory  Recent Labs     12/23/21  0306   WBC 8.6   RBC 4.16*   HEMOGLOBIN 12.8   HEMATOCRIT 39.4   MCV 94.7   MCH 30.8   MCHC 32.5*   RDW 47.8   PLATELETCT 193   MPV 11.2     Recent Labs     12/23/21  0306   SODIUM 135   POTASSIUM 4.1   CHLORIDE 101   CO2 18*   GLUCOSE 163*   BUN 17   CREATININE 0.58   CALCIUM 9.9     Recent Labs     12/23/21  0306   APTT 30.1   INR 1.24*               Imaging  MR-BRAIN-W/O   Final Result      1.  Mild cerebral atrophy. Age-appropriate.   2.  Moderate supratentorial white matter disease most consistent with microvascular ischemic change.   3.  No evidence of acute infarction, acute hemorrhage, or mass lesion.      DX-FOOT-COMPLETE 3+ RIGHT   Final Result      No acute fracture or dislocation is noted.      DX-ELBOW-COMPLETE 3+ LEFT   Final Result      No radiographic evidence of acute traumatic injury.      DX-CHEST-PORTABLE (1 VIEW)   Final Result      1.  No acute cardiac or pulmonary abnormalities are identified.   2.  Cardiomegaly.   3.  Prominent interstitial markings likely reflecting chronic small airways disease.      CT-CTA NECK WITH & W/O-POST PROCESSING   Final Result      1.  No occlusion or dissection of the arteries of the neck.   2.  Severe stenosis of the left internal carotid artery origin. Moderate stenosis of the right internal carotid artery origin.   3.  Gas again seen in the right  space space of uncertain clinical significance. Correlate for trauma or infection.   4.  Gas in the facial vein and right IJ vein likely related to venous access.      CT-CTA HEAD WITH & W/O-POST PROCESS   Final Result      No large vessel occlusion or aneurysm      CT-CEREBRAL PERFUSION ANALYSIS   Final Result      1.  Cerebral blood flow less than 30% likely representing completed infarct = 0 mL.      2.  T Max more than 6 seconds likely representing combination of  completed infarct and ischemia = 7 mL.      3.  Mismatched volume likely representing ischemic brain/penumbra = 7 mL      4.  Please note that the cerebral perfusion was performed on the limited brain tissue around the basal ganglia region. Infarct/ischemia outside the CT perfusion sections can be missed in this study.      CT-HEAD W/O   Final Result      1. Cerebral atrophy.   2. White matter lucencies most consistent with small vessel ischemic change versus demyelination or gliosis.   3. No acute intracranial abnormality.   4. Soft tissue gas of the right retropharyngeal fat and right  space of uncertain clinical significance. Correlate for trauma or infection.              Assessment/Plan  * Stroke (HCC)  Assessment & Plan  ?TIA, no CVA seen on MRI   Admit patient to neurology floor  Neurology consulted  MRI brain without contrast  Neuro check every 4 hours  Continue crestor     Carotid artery stenosis  Assessment & Plan  Seen on CT angio  Pending neuro decision on inpt vs outpt vascular consult   -sees Dr Win Callejas, vascular surgery outpt, appointment on 1/4/21    Atrial fibrillation with RVR (HCC)  Assessment & Plan  Continue metoprolol, metoprolol IV as needed  Continue eliquis   Telemetry         VTE prophylaxis: SCDs/TEDs and therapeutic anticoagulation with Eliquis

## 2021-12-23 NOTE — ED NOTES
MRI screening completed.  Rounded on Pt. Updated Pt on plan of care. Pt verbalized understanding.  No acute distress at this time.  Will continue to monitor.

## 2021-12-23 NOTE — ASSESSMENT & PLAN NOTE
Seen on CT angio  Pending neuro decision on inpt vs outpt vascular consult   -sees Dr Win Callejas, vascular surgery outpt, appointment on 1/4/21

## 2021-12-23 NOTE — ED PROVIDER NOTES
ED Provider Note    CHIEF COMPLAINT  Chief Complaint   Patient presents with   • Weakness     [facility], slurred speech. pt left, symptoms worsened 4 hrs ago. pt A&Ox1-2, alert to self        HPI    Primary care provider: Not on file, visiting from [place]  Means of arrival: EMS   History obtained from: Patient, daughter, medics  History limited by: Nothing    Rosibel Corbett is a 86 y.o. female who presents with concerns for strokelike symptoms.  Approximately 20 hours ago the patient developed some dysarthria, she presented to [facility] and apparently had a CT scan that was negative, she was planned to be hospitalized for an MRI to rule out a stroke but her symptoms got better and so she left the hospital on her own accord.  She was feeling better, but then several hours ago she started having recurrent dysarthria as well as some left-sided weakness and becoming more confused than usual.  The patient is a retired [employer]  and is often very high functioning and she has now confused and cannot name the year.  Daughter reports that the patient is on Eliquis for known atrial fibrillation.  No reported alleviating factors noted, family noticed these symptoms tonight because they heard a thud and found the patient on the floor.  No obvious head injury for medics or bruising.  The patient denies any other associated recent medical illness like chest pain or dyspnea or fevers or vomiting.  No double or blurry or loss of vision.  No aggravating factors noted.  Symptoms have been stuttering on and off for the last 20 hours.  Given less than 24 hours of symptoms, patient was activated as a stroke IR per field report, but it was determined given she has had trauma, 20+ hours of stuttering symptoms, and anticoagulated on apixaban she is not a candidate for alteplase/TPA.    REVIEW OF SYSTEMS  Constitutional: Negative for fever or chills.  Positive for  "possibly falling out of bed.  HENT: Negative for rhinorrhea or sore throat.    Eyes: Negative for double or blurry or loss of vision.  Respiratory: Negative for cough or shortness of breath.    Cardiovascular: Negative for chest pain or syncope.   Gastrointestinal: Negative for nausea, vomiting, or abdominal pain.   Genitourinary: Negative for dysuria or flank pain.   Musculoskeletal: Negative for back pain or joint pain.   Skin: Negative for itching or rash.   Neurological: Positive for speech changes and confusion and left arm and face weakness.  See HPI for further details. All other systems are negative.     PAST MEDICAL HISTORY   has a past medical history of Hypertension.    PAST FAMILY HISTORY  History reviewed. No pertinent family history.    SOCIAL HISTORY  Social History     Tobacco Use   • Smoking status: Never Smoker   • Smokeless tobacco: Never Used   Vaping Use   • Vaping Use: Never used   Substance and Sexual Activity   • Alcohol use: Yes     Comment: occ   • Drug use: Never   • Sexual activity: Not on file       SURGICAL HISTORY   has a past surgical history that includes stent placement.    CURRENT MEDICATIONS  Home Medications     Reviewed by Tamela Faria (Pharmacy Tech) on 12/23/21 at 0344  Med List Status: Complete   Medication Last Dose Status   acetaminophen (TYLENOL) 325 MG Tab UNK Active   apixaban (ELIQUIS) 2.5mg Tab unk Active   bimatoprost (LUMIGAN) 0.03 % Solution unk Active   dorzolamide-timolol (COSOPT) 22.3-6.8 MG/ML Solution unk Active   fluticasone (FLONASE) 50 MCG/ACT nasal spray unk Active   metoprolol SR (TOPROL XL) 25 MG TABLET SR 24 HR unk Active   metoprolol tartrate (LOPRESSOR) 50 MG Tab unk Active                ALLERGIES  No Known Allergies    PHYSICAL EXAM  VITAL SIGNS: /87   Pulse 91   Temp 37.2 °C (98.9 °F) (Temporal)   Resp 18   Ht 1.753 m (5' 9\")   Wt 81.6 kg (180 lb)   SpO2 97%   BMI 26.58 kg/m²    Pulse ox interpretation: On room air, I " interpret this pulse ox as normal.  Constitutional: Well-appearing for age but sitting up in moderate distress.  HEENT: Normocephalic, atraumatic. Posterior pharynx clear, mucous membranes dry, subtle left facial droop present.  Eyes:  EOMI. Normal sclerae.  Neck: Supple, nontender.  Chest/Pulmonary: Clear to ausculation bilaterally, no wheezes or rhonchi.  Cardiovascular: Fast, irregular, 2 out of 6 systolic murmur.  Abdomen: Soft, nontender; no rebound, guarding, or masses.  Back: No CVA or midline tenderness.   Musculoskeletal: No deformity or tenderness, but there is trace symmetric lower extremity edema.  Neuro: Subtle left facial droop, 4-5 strength in left hand with mild pronator drift, confused to the year, NIH stroke scale 4 on arrival.  Psych: Flat affect very pleasant and cooperative.  Skin: No rashes, warm and dry.      DIAGNOSTIC STUDIES / PROCEDURES    LABS & EKG  Results for orders placed or performed during the hospital encounter of 12/23/21   CBC WITH DIFFERENTIAL   Result Value Ref Range    WBC 8.6 4.8 - 10.8 K/uL    RBC 4.16 (L) 4.20 - 5.40 M/uL    Hemoglobin 12.8 12.0 - 16.0 g/dL    Hematocrit 39.4 37.0 - 47.0 %    MCV 94.7 81.4 - 97.8 fL    MCH 30.8 27.0 - 33.0 pg    MCHC 32.5 (L) 33.6 - 35.0 g/dL    RDW 47.8 35.9 - 50.0 fL    Platelet Count 193 164 - 446 K/uL    MPV 11.2 9.0 - 12.9 fL    Neutrophils-Polys 83.00 (H) 44.00 - 72.00 %    Lymphocytes 10.30 (L) 22.00 - 41.00 %    Monocytes 5.90 0.00 - 13.40 %    Eosinophils 0.00 0.00 - 6.90 %    Basophils 0.30 0.00 - 1.80 %    Immature Granulocytes 0.50 0.00 - 0.90 %    Nucleated RBC 0.00 /100 WBC    Neutrophils (Absolute) 7.17 (H) 2.00 - 7.15 K/uL    Lymphs (Absolute) 0.89 (L) 1.00 - 4.80 K/uL    Monos (Absolute) 0.51 0.00 - 0.85 K/uL    Eos (Absolute) 0.00 0.00 - 0.51 K/uL    Baso (Absolute) 0.03 0.00 - 0.12 K/uL    Immature Granulocytes (abs) 0.04 0.00 - 0.11 K/uL    NRBC (Absolute) 0.00 K/uL   COMP METABOLIC PANEL   Result Value Ref Range     Sodium 135 135 - 145 mmol/L    Potassium 4.1 3.6 - 5.5 mmol/L    Chloride 101 96 - 112 mmol/L    Co2 18 (L) 20 - 33 mmol/L    Anion Gap 16.0 7.0 - 16.0    Glucose 163 (H) 65 - 99 mg/dL    Bun 17 8 - 22 mg/dL    Creatinine 0.58 0.50 - 1.40 mg/dL    Calcium 9.9 8.5 - 10.5 mg/dL    AST(SGOT) 31 12 - 45 U/L    ALT(SGPT) 21 2 - 50 U/L    Alkaline Phosphatase 82 30 - 99 U/L    Total Bilirubin 0.6 0.1 - 1.5 mg/dL    Albumin 4.5 3.2 - 4.9 g/dL    Total Protein 7.3 6.0 - 8.2 g/dL    Globulin 2.8 1.9 - 3.5 g/dL    A-G Ratio 1.6 g/dL   PROTHROMBIN TIME   Result Value Ref Range    PT 15.3 (H) 12.0 - 14.6 sec    INR 1.24 (H) 0.87 - 1.13   APTT   Result Value Ref Range    APTT 30.1 24.7 - 36.0 sec   COD (ADULT)   Result Value Ref Range    ABO Grouping Only O     Rh Grouping Only POS     Antibody Screen-Cod NEG    TROPONIN   Result Value Ref Range    Troponin T 11 6 - 19 ng/L   DIAGNOSTIC ALCOHOL   Result Value Ref Range    Diagnostic Alcohol <10.1 0.0 - 10.0 mg/dL   ABO Rh Confirm   Result Value Ref Range    ABO Rh Confirm O POS    ESTIMATED GFR   Result Value Ref Range    GFR If African American >60 >60 mL/min/1.73 m 2    GFR If Non African American >60 >60 mL/min/1.73 m 2   Hemoglobin A1C   Result Value Ref Range    Glycohemoglobin 5.7 (H) 4.0 - 5.6 %    Est Avg Glucose 117 mg/dL   Lipid Profile   Result Value Ref Range    Cholesterol,Tot 107 100 - 199 mg/dL    Triglycerides 57 0 - 149 mg/dL    HDL 53 >=40 mg/dL    LDL 43 <100 mg/dL   EKG (NOW)   Result Value Ref Range    Report       [facility] Emergency Dept.    Test Date:  2021  Pt Name:    RADHA WELLS               Department: ER  MRN:        [id number]                      Room:       S188  Gender:     Female                       Technician: [id number]  :        [id number]                   Requested By:HUNG Rock #:    [id number]                    Reading MD: Hung Grove MD    Measurements  Intervals                                 Axis  Rate:       118                          P:  OR:                                      QRS:        -5  QRSD:       78                           T:          73  QT:         332  QTc:        466    Interpretive Statements  Atrial fibrillation  No previous ECG available for comparison  No STEMI  Electronically Signed On 12- 13:49:24 PST by Hung Grove MD           RADIOLOGY  MR-BRAIN-W/O   Final Result      1.  Mild cerebral atrophy. Age-appropriate.   2.  Moderate supratentorial white matter disease most consistent with microvascular ischemic change.   3.  No evidence of acute infarction, acute hemorrhage, or mass lesion.      DX-FOOT-COMPLETE 3+ RIGHT   Final Result      No acute fracture or dislocation is noted.      DX-ELBOW-COMPLETE 3+ LEFT   Final Result      No radiographic evidence of acute traumatic injury.      DX-CHEST-PORTABLE (1 VIEW)   Final Result      1.  No acute cardiac or pulmonary abnormalities are identified.   2.  Cardiomegaly.   3.  Prominent interstitial markings likely reflecting chronic small airways disease.      CT-CTA NECK WITH & W/O-POST PROCESSING   Final Result      1.  No occlusion or dissection of the arteries of the neck.   2.  Severe stenosis of the left internal carotid artery origin. Moderate stenosis of the right internal carotid artery origin.   3.  Gas again seen in the right  space space of uncertain clinical significance. Correlate for trauma or infection.   4.  Gas in the facial vein and right IJ vein likely related to venous access.      CT-CTA HEAD WITH & W/O-POST PROCESS   Final Result      No large vessel occlusion or aneurysm      CT-CEREBRAL PERFUSION ANALYSIS   Final Result      1.  Cerebral blood flow less than 30% likely representing completed infarct = 0 mL.      2.  T Max more than 6 seconds likely representing combination of completed infarct and ischemia = 7 mL.      3.  Mismatched volume likely representing ischemic brain/penumbra =  7 mL      4.  Please note that the cerebral perfusion was performed on the limited brain tissue around the basal ganglia region. Infarct/ischemia outside the CT perfusion sections can be missed in this study.      CT-HEAD W/O   Final Result      1. Cerebral atrophy.   2. White matter lucencies most consistent with small vessel ischemic change versus demyelination or gliosis.   3. No acute intracranial abnormality.   4. Soft tissue gas of the right retropharyngeal fat and right  space of uncertain clinical significance. Correlate for trauma or infection.             COURSE & MEDICAL DECISION MAKING    This is a 86 y.o. female who presents with stuttering strokelike symptoms for the last 20 hours.    Differential Diagnosis includes but is not limited to:  TIA, stroke, bleed, seizure, mass    ED Course:  86-year-old female with above concerning presentation.  Stroke IR protocol initiated.  I reviewed imaging and discussed with stroke neurologist Dr. Jonathan Pollock, he reviewed the patient's imaging and I described the patient's physical findings at length, unfortunately she is not a candidate for alteplase/TPA and there is no obvious large vessel occlusion amenable to percutaneous intervention.  Plan permissive hypertension, and hospitalization for an MRI.  I have reassessed the patient multiple times she is protecting her airway has no change in her neurologic status.  Patient and daughter understand and agree with plan of care.  I would wish to allow permissive hypertension but the patient is in atrial fibrillation with rapid ventricular response, this has been controlled with parenteral fluids as well as IV metoprolol.  Hospitalist consulted, they will kindly admit for further work-up and treatment.  The patient is hemodynamically stable for admission to telemetry unit in guarded condition.    Upon my evaluation, this patient had a high probability of imminent or life-threatening deterioration due to acute  stroke, A. fib with RVR/dysrhythmia.     I personally provided 35 minutes of total critical care time outside of time spent on separately billable/documented procedures. This required my direct attention, intervention, and management which included the following:  -review of laboratory data  -review of radiology studies  -discussion with consultants: Radiologist, neurologist, hospitalist  -discussion with family and patient  -monitoring for potential decompensation with serial bedside reassessments  -IV rate controlled with parenteral metoprolol  -Consideration of alteplase/TPA      Medications   lactated ringers infusion (0 mL Intravenous Stopped 12/24/21 1000)   iohexol (OMNIPAQUE) 350 mg/mL (IV) (40 mL Intravenous Given 12/23/21 0323)   iohexol (OMNIPAQUE) 350 mg/mL (IV) (80 mL Intravenous Given 12/23/21 0326)   Metoprolol Tartrate (LOPRESSOR) injection 5 mg (5 mg Intravenous Given 12/23/21 0355)   rosuvastatin (CRESTOR) tablet 40 mg (40 mg Oral Given 12/23/21 0644)       FINAL IMPRESSION  1. Acute ischemic stroke (HCC)    2. Stenosis of left carotid artery    3. Atrial fibrillation with RVR (HCC)    4. Atrial fibrillation with rapid ventricular response (HCC)    5. Stenosis of carotid artery, unspecified laterality    6. Anticoagulated        -ADMIT-      Pertinent Labs & Imaging studies reviewed and verified by myself, as well as nursing notes and the patient's past medical, family, and social histories (See chart for details).    Portions of this record were made with voice recognition software.  Despite my review, spelling/grammar/context errors may still remain.  Interpretation of this chart should be taken in this context.    Electronically signed by Hung Grove M.D. on 12/25/2021 at 1:56 PM.

## 2021-12-24 VITALS
HEART RATE: 91 BPM | BODY MASS INDEX: 26.66 KG/M2 | RESPIRATION RATE: 18 BRPM | WEIGHT: 180 LBS | OXYGEN SATURATION: 97 % | TEMPERATURE: 98.9 F | DIASTOLIC BLOOD PRESSURE: 87 MMHG | SYSTOLIC BLOOD PRESSURE: 126 MMHG | HEIGHT: 69 IN

## 2021-12-24 PROBLEM — I63.9 STROKE (HCC): Status: RESOLVED | Noted: 2021-12-23 | Resolved: 2021-12-24

## 2021-12-24 PROBLEM — I63.9 ACUTE ISCHEMIC STROKE (HCC): Status: RESOLVED | Noted: 2021-12-23 | Resolved: 2021-12-24

## 2021-12-24 LAB
CHOLEST SERPL-MCNC: 107 MG/DL (ref 100–199)
HDLC SERPL-MCNC: 53 MG/DL
LDLC SERPL CALC-MCNC: 43 MG/DL
TRIGL SERPL-MCNC: 57 MG/DL (ref 0–149)

## 2021-12-24 PROCEDURE — 99239 HOSP IP/OBS DSCHRG MGMT >30: CPT | Performed by: HOSPITALIST

## 2021-12-24 PROCEDURE — 700102 HCHG RX REV CODE 250 W/ 637 OVERRIDE(OP): Performed by: STUDENT IN AN ORGANIZED HEALTH CARE EDUCATION/TRAINING PROGRAM

## 2021-12-24 PROCEDURE — 80061 LIPID PANEL: CPT

## 2021-12-24 PROCEDURE — 97165 OT EVAL LOW COMPLEX 30 MIN: CPT

## 2021-12-24 PROCEDURE — 36415 COLL VENOUS BLD VENIPUNCTURE: CPT

## 2021-12-24 PROCEDURE — A9270 NON-COVERED ITEM OR SERVICE: HCPCS | Performed by: STUDENT IN AN ORGANIZED HEALTH CARE EDUCATION/TRAINING PROGRAM

## 2021-12-24 RX ADMIN — DORZOLAMIDE HYDROCHLORIDE AND TIMOLOL MALEATE 1 DROP: 20; 5 SOLUTION/ DROPS OPHTHALMIC at 04:12

## 2021-12-24 RX ADMIN — APIXABAN 2.5 MG: 5 TABLET, FILM COATED ORAL at 04:12

## 2021-12-24 ASSESSMENT — COGNITIVE AND FUNCTIONAL STATUS - GENERAL
SUGGESTED CMS G CODE MODIFIER DAILY ACTIVITY: CH
DAILY ACTIVITIY SCORE: 24

## 2021-12-24 ASSESSMENT — PAIN DESCRIPTION - PAIN TYPE: TYPE: ACUTE PAIN

## 2021-12-24 ASSESSMENT — ACTIVITIES OF DAILY LIVING (ADL): TOILETING: INDEPENDENT

## 2021-12-24 NOTE — DISCHARGE PLANNING
Care Transition Team Discharge Planning    Anticipated Discharge Disposition: DC home.    Action: Lsw completed CTT assessment. Patient A&Ox4 e/b being able to confirm information on the facesheet. The dtr was also in the room visiting. The dtr assisted with answering questions as agreed by the patient who was hoarse. Patient lives at [facility] in a independent senior apartment. The patient reported being independent with all ADLs and IADLs.     Lsw assisted the family with getting transportation to [place]. There are no medical transportation available due to the holiday and inclement weather. The family is searching for uber or will transport patient home themselves.     IMM completed on 12/21/21 at 1028.    Barriers to Discharge: None    Plan:Lsw will assist medical team with DC planning.    Care Transition Team Assessment    Information Source  Orientation Level: Oriented X4  Information Given By: Patient  Informant's Name: Rosibel Corbett / Marisela Vigil  Who is responsible for making decisions for patient? : Patient    Readmission Evaluation  Is this a readmission?: No    Elopement Risk  Legal Hold: No  Ambulatory or Self Mobile in Wheelchair: No-Not an Elopement Risk  Elopement Risk: Not at Risk for Elopement    Interdisciplinary Discharge Planning  Lives with - Patient's Self Care Capacity: Alone and Able to Care For Self  Housing / Facility: Independent Living Facility  Prior Services: Skilled Home Health Services    Discharge Preparedness  What is your plan after discharge?: Home with help  What are your discharge supports?: Child  Prior Functional Level: Ambulatory,Drives Self,Independent with Activities of Daily Living,Independent with Medication Management  Difficulity with ADLs: None  Difficulity with IADLs: None    Functional Assesment  Prior Functional Level: Ambulatory,Drives Self,Independent with Activities of Daily Living,Independent with Medication Management    Finances  Financial  Barriers to Discharge: No  Prescription Coverage: Yes              Advance Directive  Advance Directive?: DPOA for Health Care  Durable Power of  Name and Contact : Marisela Vigil    Domestic Abuse  Have you ever been the victim of abuse or violence?: No    Psychological Assessment  History of Substance Abuse: None  History of Psychiatric Problems: No  Non-compliant with Treatment: No  Newly Diagnosed Illness: Yes    Discharge Risks or Barriers  Discharge risks or barriers?: No    Anticipated Discharge Information  Discharge Disposition: Discharged to home/self care (01)  Discharge Address: [address] [place] [address] [id number], [place] [address]  Discharge Contact Phone Number: [phone]

## 2021-12-24 NOTE — FACE TO FACE
Face to Face Supporting Documentation - Home Health    The encounter with this patient was in whole or in part the primary reason for home health admission.    Date of encounter:   Patient:                    MRN:                       YOB: 2020  Rosibel Corbett  [id number]  1935     Home health to see patient for:  Skilled Nursing care for assessment, interventions & education    Skilled need for:  Exacerbation of Chronic Disease State carotid stenosis    Skilled nursing interventions to include:  Comment: nursing, exam PT and OT    Homebound status evidenced by:  Needs the assistance of another person in order to leave the home. Leaving home requires a considerable and taxing effort. There is a normal inability to leave the home.    Community Physician to provide follow up care: Pcp Not In Computer     Optional Interventions? No      I certify the face to face encounter for this home health care referral meets the CMS requirements and the encounter/clinical assessment with the patient was, in whole, or in part, for the medical condition(s) listed above, which is the primary reason for home health care. Based on my clinical findings: the service(s) are medically necessary, support the need for home health care, and the homebound criteria are met.  I certify that this patient has had a face to face encounter by myself.  Sury Khan M.D. - NPI: [id number]

## 2021-12-24 NOTE — PROGRESS NOTES
4 Eyes Skin Assessment Completed by CHAVA Lemon and CHAVA Hansen.    Head WDL  Ears WDL  Nose WDL  Mouth WDL  Neck WDL  Breast/Chest WDL  Shoulder Blades WDL  Spine WDL  (R) Arm/Elbow/Hand Redness, Blanching and Bruising, skin tear  (L) Arm/Elbow/Hand Redness and Bruising  Abdomen WDL  Groin WDL  Scrotum/Coccyx/Buttocks Redness and Blanching  (R) Leg Bruising and Swelling  (L) Leg Bruising  (R) Heel/Foot/Toe Redness, Bruising and Swelling, boggy  (L) Heel/Foot/Toe Redness and Blanching, boggy          Devices In Places Tele Box, Pulse Ox and Nasal Cannula      Interventions In Place Gray Ear Foams, Heel Float Boots and Q2 Turns    Possible Skin Injury No    Pictures Uploaded Into Epic N/A  Wound Consult Placed N/A  RN Wound Prevention Protocol Ordered No

## 2021-12-24 NOTE — THERAPY
Physical Therapy   Initial Evaluation     Patient Name: Rosibel Corbett  Age:  86 y.o., Sex:  female  Medical Record #: [id number]  Today's Date: 12/23/2021     Precautions  Precautions: Fall Risk;Swallow Precautions ( See Comments)    Assessment  Patient is a very pleasant 86 y.o. female that presented to acute with slurred speech and L sided weakness; patient was found on floor by family after presumed fall. PMHx significant for HTN, HLD, CAD, Afib. She lives in independent living facility in  and has frequent visits with HH PT with no use of AD at baseline. She was in [facility] with family for the holiday. She presented to PT with impaired balance and decreased activity tolerance however appears to be near baseline functional mobility and performed bed mobility and transfers at supervision level. Initiated gait with HHA and patient required min A for balance and fall prevention but improved to supervision with use of FWW. At end of session patient was sitting in chair and this therapist was discussing DC disposition with patient and patient's daughter when patient demonstrated decreased arousal. BP 90/64 and RN notified. Anticipate patient may be tired and/or dehydrated given sequence of events leading up to and since admit but concern for safety given patient is fiercely independent and has limited insight into deficits. She mobilized well and anticipate she will do best in home environment versus SNF (likely too high functioning for IRF) but recommend supervision for OOB activity following DC given presentation, family verbalized agreement. Patient will not be actively followed for physical therapy services at this time, however may be seen if requested by physician for 1 more visit within 30 days to address any discharge or equipment needs.    Plan    Recommend Physical Therapy for Evaluation only    DC Equipment Recommendations: None (patient has FWW)  Discharge Recommendations: Recommend home health for  "continued physical therapy services (and supervision for OOB activity)       Subjective    \"I don't want to use a walker if I don't have to.\"     Objective       12/23/21 1224   Total Time Spent   Total Time Spent (Mins) 60   Charge Group   PT Evaluation PT Evaluation Mod   PT Self Care / Home Evaluation  2   Initial Contact Note    Initial Contact Note Order Received and Verified, Evaluation Only - Patient Does Not Require Further Acute Physical Therapy at this Time.  However, May Benefit from Post Acute Therapy for Higher Level Functional Deficits.   Precautions   Precautions Fall Risk;Swallow Precautions ( See Comments)   Vitals   Blood Pressure  (!) 90/64   O2 (LPM) 0   O2 Delivery Device None - Room Air   Vitals Comments BP sitting following activity, taken given decreased arousal   Pain 0 - 10 Group   Location Toe   Location Orientation Right   Therapist Pain Assessment During Activity;Post Activity Pain Same as Prior to Activity;Nurse Notified   Prior Living Situation   Prior Services Skilled Home Health Services   Housing / Facility Independent Living Facility   Equipment Owned Front-Wheel Walker  (walking sticks)   Lives with - Patient's Self Care Capacity Alone and Able to Care For Self   Comments Patient lives in [facility] [place] in ; has regular HH PT   Prior Level of Functional Mobility   Bed Mobility Independent   Transfer Status Independent   Ambulation Independent   Distance Ambulation (Feet)   (community)   Assistive Devices Used None   Comments Patient very independent and resistant to use of AD   History of Falls   History of Falls Yes  (reason for admit)   Cognition    Cognition / Consciousness WDL   Level of Consciousness Alert   Comments some [place] but pleasant and cooperative. Independent and decreased insight into fall risk   Passive ROM Lower Body   Passive ROM Lower Body WDL   Comments not formally tested, WFL for mobility   Active ROM Lower Body    Active ROM Lower Body  WDL   Comments as above "   Strength Lower Body   Lower Body Strength  WDL   Comments as above   Sensation Lower Body   Lower Extremity Sensation   Not Tested   Lower Body Muscle Tone   Lower Body Muscle Tone  WDL   Neurological Concerns   Neurological Concerns No   Coordination Lower Body    Coordination Lower Body  WDL   Balance Assessment   Sitting Balance (Static) Fair +   Sitting Balance (Dynamic) Fair +   Standing Balance (Static) Fair   Standing Balance (Dynamic) Fair -  (improved to Fair with FWW)   Weight Shift Sitting Good   Weight Shift Standing Good   Comments HHA then FWW during ambulation   Gait Analysis   Gait Level Of Assist Minimal Assist  (then supervision with FWW)   Assistive Device Hand Held Assist;Front Wheel Walker   Distance (Feet) 100  (then 50ft with FWW)   # of Times Distance was Traveled 1   Deviation Step To;Other (Comment)  (decreased domingo, clearance, step length)   # of Stairs Climbed 0   Weight Bearing Status no restrictions   Vision Deficits Impacting Mobility NT   Bed Mobility    Supine to Sit Supervised  (HOB flat, no rail)   Sit to Supine   (left sitting EOB with RN)   Scooting Supervised  (seated)   Functional Mobility   Sit to Stand Supervised   Bed, Chair, Wheelchair Transfer Supervised   Toilet Transfers Supervised   Transfer Method Stand Step   How much difficulty does the patient currently have...   Turning over in bed (including adjusting bedclothes, sheets and blankets)? 4   Sitting down on and standing up from a chair with arms (e.g., wheelchair, bedside commode, etc.) 3   Moving from lying on back to sitting on the side of the bed? 4   How much help from another person does the patient currently need...   Moving to and from a bed to a chair (including a wheelchair)? 3   Need to walk in a hospital room? 3   Climbing 3-5 steps with a railing? 3   6 clicks Mobility Score 20   Activity Tolerance   Sitting in Chair 10 min   Sitting Edge of Bed 20 min   Standing 8 min   Comments no overt pain,  fatigue, SOB, dizziness though patient with decreased arousal after session sitting in chair   Edema / Skin Assessment   Edema / Skin  Not Assessed   Education Group   Education Provided Role of Physical Therapist;Use of Assistive Device   Role of Physical Therapist Patient Response Patient;Family;Acceptance;Explanation;* * Response * *   Use of Assistive Device Patient Response Patient;Acceptance;Explanation;Demonstration;Verbal Demonstration;Action Demonstration   Anticipated Discharge Equipment and Recommendations   DC Equipment Recommendations None  (patient has FWW)   Discharge Recommendations Recommend home health for continued physical therapy services  (and supervision for OOB activity)   Interdisciplinary Plan of Care Collaboration   IDT Collaboration with  Nursing;Family / Caregiver   Patient Position at End of Therapy Seated;Edge of Bed;Family / Friend in Room  (RN at bedside)   Collaboration Comments RN aware of visit, response   Session Information   Date / Session Number  12/23 - DC needs only

## 2021-12-24 NOTE — DISCHARGE SUMMARY
Discharge Summary    CHIEF COMPLAINT ON ADMISSION  Chief Complaint   Patient presents with   • Weakness     LKW 1000 pt to [facility] for L sided weakness, slurred speech. pt left, symptoms worsened 4 hrs ago. pt A&Ox1-2, alert to self        Reason for Admission  EMS     Admission Date  12/23/2021    CODE STATUS  Full Code    HPI & HOSPITAL COURSE  Patient is an 86 year old female who presented 12/23/2021 with a mechanical fall and altered mental status.  Patient initially was noted to have weakness of the left upper extremity at 1 PM yesterday.  She was then taken to hospital in [facility], CT head there was negative, and she was sent home.  Shortly after she got home, she began to have episodes of confusion where she did not know where she was.  She decided to sleep it off, she woke up in the middle of the night and had a mechanical fall, and was noted to have facial droop and left upper extremity weakness, motivating daughter to call EMS to take patient to the ED for evaluation. In the ED, patient was found to be hypertensive and tachycardic. CT head and neck pertinent for severe stenosis of the left internal carotid artery region and moderate stenosis of the right internal carotid artery region.  EKG showed atrial fibrillation with RVR.  Neurology was consulted and recommend MRI brain without contrast and to continue Eliquis. She was not a candidate for intervention as the timing was outside of treatment window. The MRI showed no evidence of acute infarction and her presenting symptoms have resolved. She does have known left sided carotid artery disease and is followed by a vascular surgeon in . Clinically her presentation was consistent with a TIA related to the carotid disease. This was communicated with patient and her daughter including risk of further TIA's and strokes. Vascular consult here was offered and they would rather follow up with her established surgeon the first week of Jan, which is reasonable.  She was cleared by PT and OT and was referred to home health in .    She also reports one week of sore throat, no other URI symptoms, she reports regular covid testing and declined covid or influenza testing here. Supportive care for this issue was also discussed and recommended.    She will return to  today and follow up with her vascular surgeon and pcp there. She agrees to return to the ER if needed.     Therefore, she is discharged in fair and stable condition to home with organized home healthcare and close outpatient follow-up.    The patient recovered much more quickly than anticipated on admission.    Discharge Date  12/24/21    FOLLOW UP ITEMS POST DISCHARGE  Home Health  Vascular surgery  Pcp    DISCHARGE DIAGNOSES  Principal Problem:    Stroke ([facility]) POA: Unknown  Active Problems:    Atrial fibrillation with RVR ([facility]) POA: Unknown    Carotid artery stenosis POA: Unknown    Acute ischemic stroke ([facility]) POA: Yes  Resolved Problems:    * No resolved hospital problems. *      FOLLOW UP  No future appointments.  No follow-up provider specified.    MEDICATIONS ON DISCHARGE     Medication List      ASK your doctor about these medications      Instructions   acetaminophen 325 MG Tabs  Commonly known as: Tylenol   Take 325 mg by mouth every four hours as needed for Mild Pain.  Dose: 325 mg     bimatoprost 0.03 % Soln  Commonly known as: LUMIGAN   Administer 1 Drop into both eyes every evening.  Dose: 1 Drop     dorzolamide-timolol 22.3-6.8 MG/ML Soln  Commonly known as: COSOPT   Administer 1 Drop into both eyes 2 times a day.  Dose: 1 Drop     Eliquis 2.5mg Tabs  Generic drug: apixaban   Take 2.5 mg by mouth 2 times a day.  Dose: 2.5 mg     fluticasone 50 MCG/ACT nasal spray  Commonly known as: FLONASE   Administer 2 Sprays into affected nostril(S) at bedtime.  Dose: 2 Spray     metoprolol tartrate 50 MG Tabs  Commonly known as: LOPRESSOR   Take 50 mg by mouth in the morning, at noon, and at bedtime.  Dose: 50  mg     rosuvastatin 10 MG Tabs  Commonly known as: CRESTOR   Take 10 mg by mouth every evening.  Dose: 10 mg     Toprol XL 25 MG Tb24  Generic drug: metoprolol SR   Take 12.5 mg by mouth every day.  Dose: 12.5 mg            Allergies  No Known Allergies    DIET  Orders Placed This Encounter   Procedures   • Diet Order Diet: Regular     Standing Status:   Standing     Number of Occurrences:   1     Order Specific Question:   Diet:     Answer:   Regular [1]       ACTIVITY  As tolerated.  Weight bearing as tolerated    CONSULTATIONS  Neurology    PROCEDURES  MR-BRAIN-W/O   Final Result      1.  Mild cerebral atrophy. Age-appropriate.   2.  Moderate supratentorial white matter disease most consistent with microvascular ischemic change.   3.  No evidence of acute infarction, acute hemorrhage, or mass lesion.      DX-FOOT-COMPLETE 3+ RIGHT   Final Result      No acute fracture or dislocation is noted.      DX-ELBOW-COMPLETE 3+ LEFT   Final Result      No radiographic evidence of acute traumatic injury.      DX-CHEST-PORTABLE (1 VIEW)   Final Result      1.  No acute cardiac or pulmonary abnormalities are identified.   2.  Cardiomegaly.   3.  Prominent interstitial markings likely reflecting chronic small airways disease.      CT-CTA NECK WITH & W/O-POST PROCESSING   Final Result      1.  No occlusion or dissection of the arteries of the neck.   2.  Severe stenosis of the left internal carotid artery origin. Moderate stenosis of the right internal carotid artery origin.   3.  Gas again seen in the right  space space of uncertain clinical significance. Correlate for trauma or infection.   4.  Gas in the facial vein and right IJ vein likely related to venous access.      CT-CTA HEAD WITH & W/O-POST PROCESS   Final Result      No large vessel occlusion or aneurysm      CT-CEREBRAL PERFUSION ANALYSIS   Final Result      1.  Cerebral blood flow less than 30% likely representing completed infarct = 0 mL.      2.  T Max  more than 6 seconds likely representing combination of completed infarct and ischemia = 7 mL.      3.  Mismatched volume likely representing ischemic brain/penumbra = 7 mL      4.  Please note that the cerebral perfusion was performed on the limited brain tissue around the basal ganglia region. Infarct/ischemia outside the CT perfusion sections can be missed in this study.      CT-HEAD W/O   Final Result      1. Cerebral atrophy.   2. White matter lucencies most consistent with small vessel ischemic change versus demyelination or gliosis.   3. No acute intracranial abnormality.   4. Soft tissue gas of the right retropharyngeal fat and right  space of uncertain clinical significance. Correlate for trauma or infection.               LABORATORY  Lab Results   Component Value Date    SODIUM 135 12/23/2021    POTASSIUM 4.1 12/23/2021    CHLORIDE 101 12/23/2021    CO2 18 (L) 12/23/2021    GLUCOSE 163 (H) 12/23/2021    BUN 17 12/23/2021    CREATININE 0.58 12/23/2021        Lab Results   Component Value Date    WBC 8.6 12/23/2021    HEMOGLOBIN 12.8 12/23/2021    HEMATOCRIT 39.4 12/23/2021    PLATELETCT 193 12/23/2021        Total time of the discharge process exceeds 40 minutes.

## 2021-12-24 NOTE — CARE PLAN
The patient is Watcher - Medium risk of patient condition declining or worsening    Shift Goals  Clinical Goals: free from falls  Patient Goals: sleep  Family Goals: doyle    Progress made toward(s) clinical / shift goals:  able to sleep  Problem: Optimal Care of the Stroke Patient  Goal: Optimal acute care for the stroke patient  Outcome: Progressing     Problem: Knowledge Deficit - Stroke Education  Goal: Patient's knowledge of stroke and risk factors will improve  Outcome: Progressing     Problem: Neuro Status  Goal: Neuro status will remain stable or improve  Outcome: Progressing     Problem: Hemodynamic Monitoring  Goal: Patient's hemodynamics, fluid balance and neurologic status will be stable or improve  Outcome: Progressing     Problem: Mobility - Stroke  Goal: Patient's capacity to carry out activities will improve  Outcome: Progressing       Patient is not progressing towards the following goals:

## 2021-12-24 NOTE — THERAPY
Speech Language Pathology   Clinical Swallow Evaluation     Patient Name: Rosibel Corbett  AGE:  86 y.o., SEX:  female  Medical Record #: [id number]  Today's Date: 12/23/2021     Assessment  The patient is a  86 y.o. female who presented 12/23/2021 with a mechanical fall and altered mental status.  Patient initially was noted to have weakness of the left upper extremity at 1 PM yesterday.  She was then taken to hospital in [facility], CT head there was negative, and she was sent home. She decided to sleep it off, she woke up in the middle of the night and had a mechanical fall, and was noted to have facial droop and left upper extremity weakness, motivating daughter to call EMS to take patient to the ED for evaluation. In the ED, patient was found to be hypertensive and tachycardic. CT head and neck pertinent for severe stenosis of the left internal carotid artery region and moderate stenosis of the right internal carotid artery region.  EKG shows atrial fibrillation with RVR.  Neurology was consulted, recommend MRI brain without contrast in a.m. and to continue Eliquis. No candidate for intervention as patient out of window.    The patient was seen for clinical swallow evaluation. The patient was asleep upon SLP arrival and had some confusion noted upon waking which cleared as session progressed. The patient was able to follow oral motor directives which revealed very subtle left labial asymmetry at rest but improved symmetry with motor tasks. The patient was given PO trials of ice chips, thins, purees and solids. The patient consumed PO trials with no overt s/s of aspiration or difficulty (aside from self feeding). The patient was provided education regarding role of SLP in acute care as well as s/s of oropharyngeal dysphagia and/or aspiration. Patient verbalized understanding.     Plan    Recommendations: 1) Initiate Regular/thin liquid meal items with standard swallow strategies. 2) Hold PO with any difficulty or change  in status.    Recommend Speech Therapy 2 times per week until therapy goals are met for the following treatments:  Dysphagia Training.    Discharge Recommendations: Anticipate that the patient will have no further speech therapy needs after discharge from the hospital    Objective       12/23/21 1119   Oral Motor Eval    Is Patient Able to Complete Oral Motor Eval Yes but Impaired   Labial Function   Labial Structure At Rest Minimal  (Very subtle left labial asymmetry at rest )   Labial Vowel Production / I /, / U / Within Functional Limits   Labial Sequence / I /, / U / Within Functional Limits   Lingual Function   Lingual Structure At Rest Within Functional Limits   Lingual Protrude Within Functional Limits   Lingual Retract Within Functional Limits   Elevate In Mouth Within Functional Limits   Elevate Outside Mouth Within Functional Limits   Lateralization No Impairment Right;No Impairment Left   / Pa / 5X's Within Functional Limits   / Ta / 5X's Within Functional Limits   / Ka / 5X's Within Functional Limits   / [place] / 5X's Within Functional Limits   Jaw   Jaw Structure At Rest Within Functional Limits   Bite ([place]) Within Functional Limits   Chew ([place]) Within Functional Limits   Laryngeal Function   Voice Quality Within Functional Limits   Volutional Cough Within Functional Limits   Excursion Upon Swallow Complete   Oral Food Presentation   Ice Chips Within Functional Limits   Single Swallow Thin (0) Within Functional Limits   Serial Swallow Thin (0) Within Functional Limits   Liquidised (3) Within Functional Limits   Pureed (4) Within Functional Limits   Soft & Bite-Sized (6) - (Dysphagia III) Within Functional Limits   Regular (7) Within Functional Limits   Self Feeding Independent   Tracheostomy   Tracheostomy  No   Dysphagia Strategies / Recommendations   Strategies / Interventions Recommended (Yes / No) Yes   Compensatory Strategies Head of Bed 90 Degrees During Eating / Drinking;Multiple  "Swallows;Single Sips / Bites   Diet / Liquid Recommendation Regular (7);Thin (0)   Medication Administration  Whole with Liquid Wash   Therapy Interventions Dysphagia Therapy By Speech Language Pathologist;Pharyngeal / Laryngeal Exercises;Oral Motor Exercises   Dysphagia Rating   Nutritional Liquid Intake Rating Scale Non thickened beverages   Nutritional Food Intake Rating Scale Total oral diet with no restrictions   Patient / Family Goals   Patient / Family Goal #1 \" to get home to [facility]\"   Short Term Goals   Short Term Goal # 1 The patient will utilize swallow strategies during regular/thin liquid meal items with no overt s/s of aspiration given min A.          "

## 2021-12-24 NOTE — DISCHARGE INSTRUCTIONS
Carotid Artery Disease    The carotid arteries are arteries on both sides of the neck. They carry blood to the brain, face, and neck. Carotid artery disease happens when these arteries become smaller (narrow) or get blocked. If these arteries become smaller or get blocked, you are more likely to have a stroke or a warning stroke (transient ischemic attack).  Follow these instructions at home:  · Take over-the-counter and prescription medicines only as told by your doctor.  · Make sure you understand all instructions about your medicines. Do not stop taking your medicines without talking to your doctor first.  · Follow your doctor's diet instructions. It is important to follow a healthy diet.  ? Eat foods that include plenty of:  ? Fresh fruits.  ? Vegetables.  ? Lean meats.  ? Avoid these foods:  ? Foods that are high in fat.  ? Foods that are high in salt (sodium).  ? Foods that are fried.  ? Foods that are processed.  ? Foods that have few good nutrients (poor nutritional value).  · Keep a healthy weight.  · Stay active. Get at least 30 minutes of activity every day.  · Do not smoke.  · Limit alcohol use to:  ? No more than 2 drinks a day for men.  ? No more than 1 drink a day for women who are not pregnant.  · Do not use illegal drugs.  · Keep all follow-up visits as told by your doctor. This is important.  Contact a doctor if:  Get help right away if:  · You have any symptoms of stroke or TIA. The acronym BEFAST is an easy way to remember the main warning signs of stroke.  ? B = Balance problems. Signs include dizziness, sudden trouble walking, or loss of balance  ? E = Eye problems. This includes trouble seeing or a sudden change in vision.  ? F = Face changes. This includes sudden weakness or numbness of the face, or the face or eyelid drooping to one side.  ? A = Arm weakness or numbness. This happens suddenly and usually on one side of the body.  ? S = Speech problems. This includes trouble speaking or  "trouble understanding.  ? T = Time. Time to call 911 or seek emergency care. Do not wait to see if symptoms go away. Make note of the time your symptoms started.  · Other signs of stroke may include:  ? A sudden, severe headache with no known cause.  ? Feeling sick to your stomach (nauseous) or throwing up (vomiting).  ? Seizure.  Call your local emergency services (911 in [place] Do not drive yourself to the clinic or hospital.  Summary  · The carotid arteries are arteries on both sides of the neck.  · If these arteries get smaller or get blocked, you are more likely to have a stroke or a warning stroke (transient ischemic attack).  · Take over-the-counter and prescription medicines only as told by your doctor.  · Keep all follow-up visits as told by your doctor. This is important.  This information is not intended to replace advice given to you by your health care provider. Make sure you discuss any questions you have with your health care provider.  Document Released: 12/04/2013 Document Revised: 12/13/2018 Document Reviewed: 12/13/2018  [employer] Patient Education © 2020 [employer]  Transient Ischemic Attack    A transient ischemic attack (TIA) is a \"warning stroke\" that causes stroke-like symptoms that go away quickly. A TIA does not cause lasting damage to the brain. But having a TIA is a sign that you may be at risk for a stroke. Lifestyle changes and medical treatments can help prevent a stroke.  It is important to know the symptoms of a TIA and what to do. Get help right away, even if your symptoms go away. The symptoms of a TIA are the same as those of a stroke. They can happen fast, and they usually go away within minutes or hours. They can include:  · Weakness or loss of feeling in your face, arm, or leg. This often happens on one side of your body.  · Trouble walking.  · Trouble moving your arms or legs.  · Trouble talking or understanding what people are saying.  · Trouble seeing.  · Seeing two of one " object (double vision).  · Feeling dizzy.  · Feeling confused.  · Loss of balance or coordination.  · Feeling sick to your stomach (nauseous) and throwing up (vomiting).  · A very bad headache for no reason.  What increases the risk?  Certain things may make you more likely to have a TIA. Some of these are things that you can change, such as:  · Being very overweight (obese).  · Using products that contain nicotine or tobacco, such as cigarettes and e-cigarettes.  · Taking birth control pills.  · Not being active.  · Drinking too much alcohol.  · Using drugs.  Other risk factors include:  · Having an irregular heartbeat (atrial fibrillation).  · Being  or .  · Having had blood clots, stroke, TIA, or heart attack in the past.  · Being a woman with a history of high blood pressure in pregnancy (preeclampsia).  · Being over the age of 60.  · Being male.  · Having family history of stroke.  · Having the following diseases or conditions:  ? High blood pressure.  ? High cholesterol.  ? Diabetes.  ? Heart disease.  ? Sickle cell disease.  ? Sleep apnea.  ? Migraine headache.  ? Long-term (chronic) diseases that cause soreness and swelling (inflammation).  ? Disorders that affect how your blood clots.  Follow these instructions at home:  Medicines    · Take over-the-counter and prescription medicines only as told by your doctor.  · If you were told to take aspirin or another medicine to thin your blood, take it exactly as told by your doctor.  ? Taking too much of the medicine can cause bleeding.  ? Taking too little of the medicine may not work to treat the problem.  Eating and drinking    · Eat 5 or more servings of fruits and vegetables each day.  · Follow instructions from your doctor about your diet. You may need to follow a certain diet to help lower your risk of having a stroke. You may need to:  ? Eat a diet that is low in fat and salt.  ? Eat foods that contain a lot of fiber.  ? Limit the  "amount of carbohydrates and sugar in your diet.  · Limit alcohol intake to 1 drink a day for nonpregnant women and 2 drinks a day for men. One drink equals 12 oz of beer, 5 oz of wine, or 1½ oz of hard liquor.  General instructions  · Keep a healthy weight.  · Stay active. Try to get at least 30 minutes of activity on all or most days.  · Find out if you have a condition called sleep apnea. Get treatment if needed.  · Do not use any products that contain nicotine or tobacco, such as cigarettes and e-cigarettes. If you need help quitting, ask your doctor.  · Do not abuse drugs.  · Keep all follow-up visits as told by your doctor. This is important.  Get help right away if:  · You have any signs of stroke. \"BE FAST\" is an easy way to remember the main warning signs:  ? B - Balance. Signs are dizziness, sudden trouble walking, or loss of balance.  ? E - Eyes. Signs are trouble seeing or a sudden change in how you see.  ? F - Face. Signs are sudden weakness or loss of feeling of the face, or the face or eyelid drooping on one side.  ? A - Arms. Signs are weakness or loss of feeling in an arm. This happens suddenly and usually on one side of the body.  ? S - Speech. Signs are sudden trouble speaking, slurred speech, or trouble understanding what people say.  ? T - Time. Time to call emergency services. Write down what time symptoms started.  · You have other signs of stroke, such as:  ? A sudden, very bad headache with no known cause.  ? Feeling sick to your stomach (nausea).  ? Throwing up (vomiting).  ? Jerky movements that you cannot control (seizure).  These symptoms may be an emergency. Do not wait to see if the symptoms will go away. Get medical help right away. Call your local emergency services (911 in the [place] Do not drive yourself to the hospital.  Summary  · A transient ischemic attack (TIA) is a \"warning stroke\" that causes stroke-like symptoms that go away quickly.  · A TIA is a medical emergency. Get help " right away, even if your symptoms go away.  · A TIA does not cause lasting damage to the brain.  · Having a TIA is a sign that you may be at risk for a stroke. Lifestyle changes and medical treatments can help prevent a stroke.  This information is not intended to replace advice given to you by your health care provider. Make sure you discuss any questions you have with your health care provider.  Document Released: 09/26/2009 Document Revised: 09/13/2019 Document Reviewed: 03/21/2018  [employer] Patient Education © 2020 [employer]  Discharge Instructions    Discharged to home by car with relative. Discharged via wheelchair, hospital escort: Yes.  Special equipment needed: Not Applicable    Be sure to schedule a follow-up appointment with your primary care doctor or any specialists as instructed.     Discharge Plan:   Diet Plan: Discussed  Activity Level: Discussed  Confirmed Follow up Appointment: No (Comments)  Confirmed Symptoms Management: Discussed  Medication Reconciliation Updated: Yes    I understand that a diet low in cholesterol, fat, and sodium is recommended for good health. Unless I have been given specific instructions below for another diet, I accept this instruction as my diet prescription.   Other diet: regular    Special Instructions: None    · Is patient discharged on Warfarin / Coumadin?   No     Depression / Suicide Risk    As you are discharged from this [facility] facility, it is important to learn how to keep safe from harming yourself.    Recognize the warning signs:  · Abrupt changes in personality, positive or negative- including increase in energy   · Giving away possessions  · Change in eating patterns- significant weight changes-  positive or negative  · Change in sleeping patterns- unable to sleep or sleeping all the time   · Unwillingness or inability to communicate  · Depression  · Unusual sadness, discouragement and loneliness  · Talk of wanting to die  · Neglect of personal  appearance   · Rebelliousness- reckless behavior  · Withdrawal from people/activities they love  · Confusion- inability to concentrate     If you or a loved one observes any of these behaviors or has concerns about self-harm, here's what you can do:  · Talk about it- your feelings and reasons for harming yourself  · Remove any means that you might use to hurt yourself (examples: pills, rope, extension cords, firearm)  · Get professional help from the community (Mental Health, Substance Abuse, psychological counseling)  · Do not be alone:Call your Safe Contact- someone whom you trust who will be there for you.  · Call your local CRISIS HOTLINE 741-1299 or 601-627-8800  · Call your local Children's Mobile Crisis Response Team [place] (448) 959-6253 or www.[employer]  · Call the toll free National Suicide Prevention Hotlines   · National Suicide Prevention Lifeline 897-339-HSEX (5522)  · National Hope Line Network 800-SUICIDE (206-5824)

## 2021-12-24 NOTE — THERAPY
Occupational Therapy   Initial Evaluation     Patient Name: Rosibel Corbett  Age:  86 y.o., Sex:  female  Medical Record #: [id number]  Today's Date: 12/24/2021     Precautions  Precautions: Fall Risk,Swallow Precautions ( See Comments)    Assessment  Patient is 86 y.o. female with a diagnosis of possible CVA.  Pt is at or near his/her functional baseline. Pt with no further skilled OT needs in the acute care setting at this time.  Noted a slight decrease in coordination L hand.      Plan     Occupational Therapy for Evaluation only        Discharge Recommendations: (P) Anticipate that the patient will have no further occupational therapy needs after discharge from the hospital        12/24/21 1035   Prior Living Situation   Housing / Facility Independent Living Facility   Equipment Owned Front-Wheel Walker  (uses walking sticks)   Lives with - Patient's Self Care Capacity Alone and Able to Care For Self   Prior Level of ADL Function   Self Feeding Independent   Grooming / Hygiene Independent   Bathing Independent   Dressing Independent   Toileting Independent   ADL Assessment   Grooming Supervision   Upper Body Dressing Supervision   Lower Body Dressing Supervision   Toileting Supervision   Functional Mobility   Sit to Stand Supervised   Bed, Chair, Wheelchair Transfer Supervised   Toilet Transfers Supervised

## 2021-12-25 LAB — EKG IMPRESSION: NORMAL
